# Patient Record
Sex: MALE | Race: OTHER | Employment: STUDENT | URBAN - METROPOLITAN AREA
[De-identification: names, ages, dates, MRNs, and addresses within clinical notes are randomized per-mention and may not be internally consistent; named-entity substitution may affect disease eponyms.]

---
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
## 2025-01-06 ENCOUNTER — OFFICE VISIT (OUTPATIENT)
Dept: ORTHOPEDICS CLINIC | Facility: CLINIC | Age: 20
End: 2025-01-06

## 2025-01-06 VITALS — BODY MASS INDEX: 23.49 KG/M2 | HEIGHT: 68 IN | WEIGHT: 155 LBS

## 2025-01-06 DIAGNOSIS — M23.52 RECURRENT LEFT KNEE INSTABILITY: ICD-10-CM

## 2025-01-06 DIAGNOSIS — G89.29 CHRONIC PAIN OF LEFT KNEE: Primary | ICD-10-CM

## 2025-01-06 DIAGNOSIS — M25.562 CHRONIC PAIN OF LEFT KNEE: Primary | ICD-10-CM

## 2025-01-06 NOTE — PROGRESS NOTES
[facility] New Consult      Chief Complaint   Patient presents with    Knee Pain     LEFT KNEE  -Ongoing pain for almost one month  -Had injury to the knee years ago       HPI: The patient is a 19 year old male who presents for orthopaedic consultation with complaints of new onset left knee pain.  He relates stepping awkwardly and twisting this knee about a month ago.  He presented to the [facility] urgent care where x-rays were obtained and a brace was provided.  There was some associated swelling, stiffness and pain which has substantially improved with conservative care.  He reports a remote history of significant football injury to this knee about 3 years ago when he was struck by another defender as a slot .  He was unable to continue play and described greater pain and swelling associated with this remote injury.  This left knee has perhaps never been the same although he has been able to return to recreational sports such as basketball without recurrent instability until this recent episode.  He denies catching, locking, redness, warmth or radiating pain.  He is currently in trade school to become an .    Past Medical History:    Broken bone    right arm age 5     History reviewed. No pertinent surgical history.  Current Outpatient Medications   Medication Sig Dispense Refill    methylPREDNISolone (MEDROL) 4 MG Oral Tablet Therapy Pack Dosepack: take as directed 1 each 0     Allergies[1]  Family History   Problem Relation Age of Onset    Hypertension Father     Cancer Maternal Grandmother         eardrum cancer     Social History     Occupational History    Not on file   Tobacco Use    Smoking status: Never     Passive exposure: Never    Smokeless tobacco: Never    Tobacco comments:     NO EXPOSURE    Vaping Use    Vaping status: Never Used   Substance and Sexual Activity    Alcohol use: No    Drug use: No    Sexual activity: Not on file        ROS:  Complete ROS  reviewed by me and non-contributory to the chief complaint except as mentioned above.    Physical Exam:    Ht 5' 8\" (1.727 m)   Wt 155 lb (70.3 kg)   BMI 23.57 kg/m²   Constitutional: Well developed, well nourished 19 year old male presenting with his mother  Psychological: NAD, alert and appropriate  Respiratory: Breathing comfortably on room air with RR of 10-14  Cardiac: Palpable distal pulses with pink warm extremities  Left knee reveals no discoloration, palpable effusion or warmth.  There is adequate full range of motion 0 to 135 degrees with intermittent click at the patellofemoral joint with a negative grind test.  Collaterals are nontender with solid endpoints on varus valgus stress.  Posterior medial and posterior lateral joint lines are trace sensitive with a negative Diandra's and Steinmann's test.  Lachman is 0-1+ with a softer endpoint on the left compared to the right.  Posterior drawer is negative.  Neurovascular status is intact on sensory, motor and perfusion assessment distally.    Imaging: Left knee x-rays 12/12/2024 personally viewed, independently interpreted and radiology report read.  Trace effusion is suspected with no fracture dislocation or bony abnormality.    XR KNEE (1 OR 2 VIEWS), LEFT (CPT=73560)    Result Date: 12/12/2024  CONCLUSION: Questionable very small suprapatellar joint effusion with no underlying osseous abnormality    Dictated by (CST): Carlos Contreras MD on 12/12/2024 at 6:21 PM     Finalized by (CST): Carlos Contreras MD on 12/12/2024 at 6:21 PM           Assessment/Diagnoses:  Diagnoses and all orders for this visit:    Chronic pain of left knee    Recurrent left knee instability        Plan:  I reviewed imaging and exam findings with the patient and his mother.  His history and exam is relatively benign with a trace positive Lachman suggestive of a possible remote injury to his ACL.  This most likely was associated with the reported football injury 3 years ago.   Ultimately, if pain, instability and swelling persists further imaging with an MRI would be an appropriate next step.  For now, activity protection, icing, anti-inflammatories and conservative management should result in continued improvement and return to baseline.  I advised him to avoid running jumping cutting and pivoting activities until he is completely asymptomatic.  I told him frankly that he may have had a partial injury to his ACL resulting in subtle micro instability of this knee.  If he does not return to baseline, I would recommend an MRI for further workup.  All questions were answered and he and his mother verbalized understanding and appreciation.      Susie Phipps MD, [facility]  Orthopaedic Surgery   Sports Medicine/Knee and Shoulder  [facility]  t: [phone]  f: [phone]               This document was partially prepared using Dragon Medical voice recognition software.  Although every attempt is made to correct errors during dictation, discrepancies may still exist.         [1] No Known Allergies

## (undated) NOTE — LETTER
10/03/19      Patient: Dariusz Cifuentes  : 9/15/2005 Visit date: 10/3/2019    Dear Reza Dubose,      I examined your patient in consultation today.     He has a nondisplaced Salter II fracture of the distal phalanx of the right middle finger

## (undated) NOTE — LETTER
10/24/2023          To Whom It May Concern:    Luis Enrique Matamoros is currently under my medical care and may not return to work at this time. He may return to work once clear by orthopedic. Activity is restricted as follows: Off work for now. If you require additional information please contact our office.         Sincerely,       Lyubov Jules MD          Document generated by:  Lyubov Jules MD

## (undated) NOTE — LETTER
VACCINE ADMINISTRATION RECORD  PARENT / GUARDIAN APPROVAL  Date: 2018  Vaccine administered to: Karina Nickerson     : 9/15/2005    MRN: [id number]    A copy of the appropriate Centers for Disease Control and Prevention Vaccine Information stateme

## (undated) NOTE — LETTER
VACCINE ADMINISTRATION RECORD  PARENT / GUARDIAN APPROVAL  Date: 2022  Vaccine administered to: Carolyn London     : 9/15/2005    MRN: [id number]    A copy of the appropriate Centers for Disease Control and Prevention Vaccine Information statement has been provided. I have read or have had explained the information about the diseases and the vaccines listed below. There was an opportunity to ask questions and any questions were answered satisfactorily. I believe that I understand the benefits and risks of the vaccine cited and ask that the vaccine(s) listed below be given to me or to the person named above (for whom I am authorized to make this request). VACCINES ADMINISTERED:  Menveo, Bexero    I have read and hereby agree to be bound by the terms of this agreement as stated above. My signature is valid until revoked by me in writing. This document is signed by, relationship: Mother on 2022.:                                                                                                                                         Parent / [facility] Signature                                                Date    Kiran Gaston served as a witness to authentication that the identity of the person signing electronically is in fact the person represented as signing. This document was generated by Kaushal Langston MA on 2022.

## (undated) NOTE — Clinical Note
[address] [place] of Child Health Examination       Student's Name  Dev & Shelton by physician & supported with lab confirmation. Attach copy of lab result. *MEASLES (Rubeola)  MO/DA/YR        * MUMPS MO/DA/YR       HEPATITIS B   MO/DA/YR        VARICELLA MO/DA/YR           2.   History of varicella (chickenpox) disease is acceptab Yes       No  Hospitalizations? When? What for? Yes       No    Blood disorders? Hemophilia, Sickle Cell, Other? Explain. Yes       No  Surgery? (List all.)  When? What for? Yes       No    Diabetes? Yes       No  Serious injury or illness?   Yes in licensed or public school operated day care, ,  nursery school and/or  (blood test req’d if resides in [place] or high risk zip)   Questionnaire Administered:Yes   Blood Test Indicated:No   Blood Test Date                 Result: MENTAL HEALTH/OTHER   Is there anything else the school should know about this student?   No  If you would like to discuss this student's health with school or school health professional, check title:  __Nurse  __Teacher  __Counselor  __Principal   [address] [place] [address] Road

## (undated) NOTE — LETTER
[facility] [employer] of Child Health Examination       Student's Name  Rhiannon Jesus Title                           Date     Signature HEALTH HISTORY          TO BE COMPLETED AND SIGNED BY PARENT/GUARDIAN AND VERIFIED BY HEALTH CARE PROVIDER    ALLERGIES  (Food, drug, insect, other)  Patient has no known allergies.  MEDICATION  (List all prescribed or taken on a regular basis.)  No current /67   Pulse 80   Temp 98.2 °F (36.8 °C) (Oral)   Ht 5' 4\" (1.626 m)   Wt 121 lb 12.8 oz (55.2 kg)   BMI 20.91 kg/m²     DIABETES SCREENING  BMI>85% age/sex  No And any two of the following:  Family History No    Ethnic Minority  No          Signs of Respiratory Yes                   Diagnosis of Asthma: No Mental Health Yes        Currently Prescribed Asthma Medication:            Quick-relief  medication (e.g. Short Acting Beta Antagonist): No          Controller medication (e.g. inhaled corticostero

## (undated) NOTE — LETTER
Date & Time: 9/27/2021, 9:02 PM  Patient: Tina Maloney  Encounter Provider(s):    JEREMY Tapia       To Whom It May Concern:    Tina Maloney was seen and treated in our department on 9/27/2021.  He should not participate in gym/sport

## (undated) NOTE — LETTER
VACCINE ADMINISTRATION RECORD  PARENT / GUARDIAN APPROVAL  Date: 10/14/2017  Vaccine administered to: Nikole Osorio     : 9/15/2005    MRN: [id number]    A copy of the appropriate Centers for Disease Control and Prevention Vaccine Information statem

## (undated) NOTE — ED AVS SNAPSHOT
Parent/Legal Guardian Access to the Online REHAPP Record of a Patient 15to 16Years Old  Return completed form by Secure email to [facility]/Medical Records Department: [email or web address]     Requirements and Procedures   Under [facility] MyChart ID and password with another person, that person may be able to view my or my child’s health information, and health information about someone who has authorized me as a MyChart proxy.    ·  I agree that it is my responsibility to select a confident Sign-Up Form and I agree to its terms.        Authorization Form     Please enter Patient’s information below:   Name (last, first, middle initial) __________________________________________   Gender  Male  Female    Last 4 Digits of Social Security Number Parent/Legal Guardian Signature                                  For Patient (1517 years of age)  I agree to allow my parent/legal guardian, named above, online access to my medical information currently available and that may become available as a result

## (undated) NOTE — LETTER
Name:  Letty Moon Year:  9th Grade Class: Student ID No.:   Address:  [facility]  Phone:  [phone] (home)  :  15year old   Name Relationship [address] Work Phone Home Phone Mobile Phone   1.  Lyly Sifuentes polymorphic ventricular tachycardia? No   15. Does anyone in your family have a heart problem, pacemaker, or implanted defibrillator? No   16. Has anyone in your family had unexplained fainting, seizures, or near drowning?  No   BONE AND JOINT QUESTIONS 37. Do you have headaches with exercise? No   38. Have you ever had numbness, tingling, or weakness in your arms or legs after being hit or falling? No   39. Have you ever been unable to move your arms / legs after being hit /fall? No   40.  Have you ever be Appearance:  Marfan stigmata (kyphoscoliosis, high-arched palate, pectus excavatum,      arachnodactyly, arm span > height, hyperlaxity, myopia, MVP, aortic insufficiency) Yes    Eyes/Ears/Nose/Throat:    · Pupils equal  · Hearing Yes    Lymph nodes Yes that I/our student will not use performance-enhancing substances as defined in the [facility] Performance-Enhancing Substance Testing Program Protocol.  We have reviewed the policy and understand that I/our student may be asked to submit to testing for the presen

## (undated) NOTE — Clinical Note
VACCINE ADMINISTRATION RECORD  PARENT / GUARDIAN APPROVAL  Date: 2017  Vaccine administered to: Ila Mayorga     : 9/15/2005    MRN: [id number]    A copy of the appropriate Centers for Disease Control and Prevention Vaccine Information stateme

## (undated) NOTE — LETTER
10/24/19      Patient: Neal Tao  : 9/15/2005 Visit date: 10/24/2019    Dear Zoila Zamudio,      I examined your patient in follow-up today. He is 3 weeks post right middle finger distal phalangeal nondisplaced Salter II fracture.

## (undated) NOTE — LETTER
Date & Time: 10/21/2023, 12:07 PM  Patient: Luis Enrique Matamoros  Encounter Provider(s):    JEREMY Crowder RN       To Whom It May Concern:    Luis Enrique Matamoros was seen and treated in our department on 10/21/2023. He should not participate in gym/sports until follow up with orthopedic specialist .  Needs to wear the knee immobilizer and use crutches until follow-up with orthopedist.    If you have any questions or concerns, please do not hesitate to call.        _____________________________  Marissa Duke.  Cristiana Mcgovern

## (undated) NOTE — LETTER
[facility] [employer] of Child Health Examination       Student's Name  Ash Likes Birth Title                           Date     Signature                                                                                                                                              Title BY PARENT/GUARDIAN AND VERIFIED BY HEALTH CARE PROVIDER    ALLERGIES  (Food, drug, insect, other)  Patient has no known allergies. MEDICATION  (List all prescribed or taken on a regular basis.)  No current outpatient medications on file.    Diagnosis of ast Ht 5' 6\" (1.676 m)   Wt 135 lb 12.8 oz (61.6 kg)   BMI 21.92 kg/m²     DIABETES SCREENING  BMI>85% age/sex  No And any two of the following:  Family History No    Ethnic Minority  No          Signs of Insulin Resistance (hypertension, dyslipidemia, polycy Asthma Medication:            Quick-relief  medication (e.g. Short Acting Beta Antagonist): No          Controller medication (e.g. inhaled corticosteroid):   No Other   NEEDS/MODIFICATIONS required in the school setting  None DIETARY Needs/Restrictions

## (undated) NOTE — ED AVS SNAPSHOT
Parent/Legal Guardian Access to the Online [facility]ather Record of a Patient 15to 16Years Old  Return completed form by Secure email to [facility] HIM/Medical Records Department: [email or web address]     Requirements and Procedures   Under [facility] MyChart ID and password with another person, that person may be able to view my or my child’s health information, and health information about someone who has authorized me as a MyChart proxy.    ·  I agree that it is my responsibility to select a confident Sign-Up Form and I agree to its terms.        Authorization Form     Please enter Patient’s information below:   Name (last, first, middle initial) __________________________________________   Gender  Male  Female    Last 4 Digits of Social Security Number Parent/Legal Guardian Signature                                  For Patient (1517 years of age)  I agree to allow my parent/legal guardian, named above, online access to my medical information currently available and that may become available as a result

## (undated) NOTE — MR AVS SNAPSHOT
[address], [address]  [address] [facility]  [phone]               Thank you for choosing us for your health care visit with Gayathri Smith MD.  We are glad to serve you and happy to provide you with this summ Proxy Access to your child’s MyChart go to [email or web address] and click on the   Sign Up Forms link in the Additional Information box on the right. MyChart Questions? Call [phone] for help.   MyChart is NOT to be used for urgent needs o Limiting fast food, take out food, and eating out at restaurants  o Preparing foods at home as a family  o Eating a diet rich in calcium  o Eating a high fiber diet    Help your children form healthy habits.   Healthy active children are more likely to be

## (undated) NOTE — LETTER
Name:  Rasheeda Mediate Year:  10th Grade Class: Student ID No.:   Address:  [place] [address] Phone:  [phone] (home)  :  13year old   Name Relationship [address] Work Phone Home Phone Mobile Phone   1.  Zunilda Zazueta ventricular tachycardia? No   15. Does anyone in your family have a heart problem, pacemaker, or implanted defibrillator? No   16. Has anyone in your family had unexplained fainting, seizures, or near drowning?  No   BONE AND JOINT QUESTIONS    17. Have you exercise? No   38. Have you ever had numbness, tingling, or weakness in your arms or legs after being hit or falling? No   39. Have you ever been unable to move your arms / legs after being hit /fall? No   40.  Have you ever become ill while exercising in th high-arched palate, pectus excavatum,      arachnodactyly, arm span > height, hyperlaxity, myopia, MVP, aortic insufficiency) Yes    Eyes/Ears/Nose/Throat:    · Pupils equal  · Hearing Yes    Lymph nodes Yes    Heart*  · Murmurs (auscultation standing, sup the [facility] Performance-Enhancing Substance Testing Program Protocol.  We have reviewed the policy and understand that I/our student may be asked to submit to testing for the presence of performance-enhancing substances in my/his/her body either during [facility] st